# Patient Record
Sex: MALE | Race: WHITE | URBAN - METROPOLITAN AREA
[De-identification: names, ages, dates, MRNs, and addresses within clinical notes are randomized per-mention and may not be internally consistent; named-entity substitution may affect disease eponyms.]

---

## 2017-04-15 ENCOUNTER — HOSPITAL ENCOUNTER (EMERGENCY)
Facility: MEDICAL CENTER | Age: 82
End: 2017-04-15
Attending: EMERGENCY MEDICINE

## 2017-04-15 ENCOUNTER — APPOINTMENT (OUTPATIENT)
Dept: RADIOLOGY | Facility: MEDICAL CENTER | Age: 82
End: 2017-04-15
Attending: EMERGENCY MEDICINE

## 2017-04-15 VITALS
BODY MASS INDEX: 20.46 KG/M2 | TEMPERATURE: 97.3 F | SYSTOLIC BLOOD PRESSURE: 148 MMHG | RESPIRATION RATE: 16 BRPM | HEART RATE: 63 BPM | HEIGHT: 68 IN | DIASTOLIC BLOOD PRESSURE: 70 MMHG | OXYGEN SATURATION: 97 % | WEIGHT: 135 LBS
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
## 2017-04-15 DIAGNOSIS — S01.01XA SCALP LACERATION, INITIAL ENCOUNTER: ICD-10-CM

## 2017-04-15 PROCEDURE — 700111 HCHG RX REV CODE 636 W/ 250 OVERRIDE (IP): Performed by: EMERGENCY MEDICINE

## 2017-04-15 PROCEDURE — 305308 HCHG STAPLER,SKIN,DISP.

## 2017-04-15 PROCEDURE — 70450 CT HEAD/BRAIN W/O DYE: CPT

## 2017-04-15 PROCEDURE — 304999 HCHG REPAIR-SIMPLE/INTERMED LEVEL 1

## 2017-04-15 PROCEDURE — 304561 HCHG STAT O2

## 2017-04-15 PROCEDURE — 700101 HCHG RX REV CODE 250

## 2017-04-15 PROCEDURE — 304562 HCHG STAT O2 MASK/CANNULA

## 2017-04-15 PROCEDURE — 99284 EMERGENCY DEPT VISIT MOD MDM: CPT

## 2017-04-15 PROCEDURE — 90715 TDAP VACCINE 7 YRS/> IM: CPT | Performed by: EMERGENCY MEDICINE

## 2017-04-15 PROCEDURE — 90471 IMMUNIZATION ADMIN: CPT

## 2017-04-15 RX ORDER — LIDOCAINE HYDROCHLORIDE AND EPINEPHRINE 10; 10 MG/ML; UG/ML
INJECTION, SOLUTION INFILTRATION; PERINEURAL
Status: COMPLETED
Start: 2017-04-15 | End: 2017-04-15

## 2017-04-15 RX ORDER — LIDOCAINE HYDROCHLORIDE 10 MG/ML
20 INJECTION, SOLUTION INFILTRATION; PERINEURAL ONCE
Status: DISCONTINUED | OUTPATIENT
Start: 2017-04-15 | End: 2017-04-15

## 2017-04-15 RX ORDER — ASPIRIN 81 MG/1
81 TABLET, CHEWABLE ORAL DAILY
COMMUNITY

## 2017-04-15 RX ORDER — DONEPEZIL HYDROCHLORIDE 5 MG/1
10 TABLET, FILM COATED ORAL NIGHTLY
COMMUNITY

## 2017-04-15 RX ORDER — LIDOCAINE HYDROCHLORIDE AND EPINEPHRINE 10; 10 MG/ML; UG/ML
20 INJECTION, SOLUTION INFILTRATION; PERINEURAL ONCE
Status: DISCONTINUED | OUTPATIENT
Start: 2017-04-15 | End: 2017-04-15

## 2017-04-15 RX ORDER — LISINOPRIL 5 MG/1
5 TABLET ORAL DAILY
COMMUNITY

## 2017-04-15 RX ORDER — SPIRONOLACTONE 25 MG/1
12.5 TABLET ORAL DAILY
COMMUNITY

## 2017-04-15 RX ORDER — LIDOCAINE HYDROCHLORIDE AND EPINEPHRINE 10; 10 MG/ML; UG/ML
20 INJECTION, SOLUTION INFILTRATION; PERINEURAL ONCE
Status: COMPLETED | OUTPATIENT
Start: 2017-04-15 | End: 2017-04-15

## 2017-04-15 RX ORDER — ATORVASTATIN CALCIUM 20 MG/1
40 TABLET, FILM COATED ORAL NIGHTLY
COMMUNITY

## 2017-04-15 RX ORDER — FUROSEMIDE 40 MG/1
40 TABLET ORAL DAILY
COMMUNITY

## 2017-04-15 RX ORDER — CARVEDILOL 3.12 MG/1
3.12 TABLET ORAL 2 TIMES DAILY WITH MEALS
COMMUNITY

## 2017-04-15 RX ADMIN — LIDOCAINE HYDROCHLORIDE,EPINEPHRINE BITARTRATE 20 ML: 10; .01 INJECTION, SOLUTION INFILTRATION; PERINEURAL at 13:15

## 2017-04-15 RX ADMIN — CLOSTRIDIUM TETANI TOXOID ANTIGEN (FORMALDEHYDE INACTIVATED), CORYNEBACTERIUM DIPHTHERIAE TOXOID ANTIGEN (FORMALDEHYDE INACTIVATED), BORDETELLA PERTUSSIS TOXOID ANTIGEN (GLUTARALDEHYDE INACTIVATED), BORDETELLA PERTUSSIS FILAMENTOUS HEMAGGLUTININ ANTIGEN (FORMALDEHYDE INACTIVATED), BORDETELLA PERTUSSIS PERTACTIN ANTIGEN, AND BORDETELLA PERTUSSIS FIMBRIAE 2/3 ANTIGEN 0.5 ML: 5; 2; 2.5; 5; 3; 5 INJECTION, SUSPENSION INTRAMUSCULAR at 13:30

## 2017-04-15 RX ADMIN — LIDOCAINE HYDROCHLORIDE AND EPINEPHRINE 20 ML: 10; 10 INJECTION, SOLUTION INFILTRATION; PERINEURAL at 13:15

## 2017-04-15 ASSESSMENT — PAIN SCALES - GENERAL: PAINLEVEL_OUTOF10: 0

## 2017-04-15 NOTE — ED NOTES
Pt placed for discharge. Pt's son will be returning with clothing and to take him back to group home. PT inappropriate to sit in triage due to hx of dementia-confusion as well as him being a fall risk and not recognizing his limits.

## 2017-04-15 NOTE — DISCHARGE INSTRUCTIONS
Return here or see your doctor in 10 days for staple removal. Return here if other new or worse symptoms

## 2017-04-15 NOTE — ED NOTES
Pt remains cooperative but very repetitive, stating the same things over and over again. Mild confusion. Pt's baseline-dementia.

## 2017-04-15 NOTE — ED NOTES
Pt resting in bed. Transport to CT. PT remains alert, continues to be repetitive. No RT distress observed.

## 2017-04-15 NOTE — ED NOTES
Chief Complaint   Patient presents with   • Fall     from group home, ground level fall, pt does not remember why he fell or if he lost consciousness. Lac to left side of head, oozing, abrasions to left FA as well-wrapped per EMS> Pt has baseline confusion/dementia, pt acting his baseline per group home. Unsure if he's taking blood thinners.

## 2017-04-15 NOTE — ED AVS SNAPSHOT
4/15/2017    Ord CHLOÉ Irwin  [address]  [place] [address]    Dear Ord:    [facility] wants to ensure your discharge home is safe and you or your loved ones have had all of your questions answered regarding your care after you leave the hospital.    Below is a list of resources and contact information should you have any questions regarding your hospital stay, follow-up instructions, or active medical symptoms.    Questions or Concerns Regarding… Contact   Medical Questions Related to Your Discharge  (7 days a week, 8am-5pm) Contact a Nurse Care Coordinator   [phone]   Medical Questions Not Related to Your Discharge  (24 hours a day / 7 days a week)  Contact the Nurse Health Line   [phone]    Medications or Discharge Instructions Refer to your discharge packet   or contact your [facility] Primary Care Provider   [phone]   Follow-up Appointment(s) Schedule your appointment via [employer]   or contact Scheduling [phone]   Billing Review your statement via [employer]  or contact Billing [phone]   Medical Records Review your records via [employer]   or contact Medical Records [phone]     You may receive a telephone call within two days of discharge. This call is to make certain you understand your discharge instructions and have the opportunity to have any questions answered. You can also easily access your medical information, test results and upcoming appointments via the [employer]ight[employer] free online health management tool. You can learn more and sign up at [email or web address]. For assistance setting up your [employer]ight[employer] account, please call [phone].    Once again, we want to ensure your discharge home is safe and that you have a clear understanding of any next steps in your care. If you have any questions or concerns, please do not hesitate to contact us, we are here for you. Thank you for choosing [facility] for your healthcare needs.    Sincerely,    Your [facility] Healthcare Team

## 2017-04-15 NOTE — ED AVS SNAPSHOT
Home Care Instructions                                                                                                                Whit Irwin   MRN: [id number]    Department:  [facility], Emergency Dept   Date of Visit:  4/15/2017            [facility], Emergency Dept    [address]    [place] [address]    Phone:  [phone]      You were seen by     Timothy Newsome M.D.      Your Diagnosis Was     Scalp laceration, initial encounter     S01.01XA       These are the medications you received during your hospitalization from 04/15/2017 1219 to 04/15/2017 1621     Date/Time Order Dose Route Action    04/15/2017 1330 tetanus-dipth-acell pertussis (ADACEL) inj 0.5 mL 0.5 mL Intramuscular Given    04/15/2017 1315 lidocaine-epinephrine 1 %-1:[id number] injection 20 mL 20 mL Injection Given      Medication Information     Review all of your home medications and newly ordered medications with your primary doctor and/or pharmacist as soon as possible. Follow medication instructions as directed by your doctor and/or pharmacist.     Please keep your complete medication list with you and share with your physician. Update the information when medications are discontinued, doses are changed, or new medications (including over-the-counter products) are added; and carry medication information at all times in the event of emergency situations.               Medication List      ASK your doctor about these medications        Instructions    Morning Afternoon Evening Bedtime    aspirin 81 MG Chew chewable tablet   Commonly known as:  ASA        Take 81 mg by mouth every day.   Dose:  81 mg                        atorvastatin 20 MG Tabs   Commonly known as:  LIPITOR        Take 40 mg by mouth every evening.   Dose:  40 mg                        carvedilol 3.125 MG Tabs   Commonly known as:  COREG        Take 3.125 mg by mouth 2 times a day, with meals.   Dose:  3.125 mg                        docusate sodium 50 MG Caps   Commonly known as:  COLACE        Take 50 mg by mouth 2 times a day.   Dose:  50 mg                        donepezil 5 MG Tabs   Commonly known as:  ARICEPT        Take 10 mg by mouth every evening.   Dose:  10 mg                        furosemide 40 MG Tabs   Commonly known as:  LASIX        Take 40 mg by mouth every day.   Dose:  40 mg                        lisinopril 5 MG Tabs   Commonly known as:  PRINIVIL        Take 5 mg by mouth every day.   Dose:  5 mg                        POTASSIUM CHLORIDE ER PO        Take  by mouth.                        spironolactone 25 MG Tabs   Commonly known as:  ALDACTONE        Take 12.5 mg by mouth every day.   Dose:  12.5 mg                        vitamin D 1000 UNIT Tabs   Commonly known as:  cholecalciferol        Take 1,000 Units by mouth every day.   Dose:  1000 Units                                Procedures and tests performed during your visit     CT-HEAD W/O    NURSING COMMUNICATION    WOUND IRRIGATION        Discharge Instructions       Return here or see your doctor in 10 days for staple removal. Return here if other new or worse symptoms          Patient Information     Patient Information    Following emergency treatment: all patient requiring follow-up care must return either to a private physician or a clinic if your condition worsens before you are able to obtain further medical attention, please return to the emergency room.     Billing Information    At [facility], we work to make the billing process streamlined for our patients.  Our Representatives are here to answer any questions you may have regarding your hospital bill.  If you have insurance coverage and have supplied your insurance information to us, we will submit a claim to your insurer on your behalf.  Should you have any questions regarding your bill, we can be reached online or by phone as follows:  Online: You are able pay your bills online or live chat with our  representatives about any billing questions you may have. We are here to help Monday - Friday from 8:00am to 7:30pm and 9:00am - 12:00pm on Saturdays.  Please visit [email or web address]  for more information.   Phone:  [phone] or [phone]    Please note that your emergency physician, surgeon, pathologist, radiologist, anesthesiologist, and other specialists are not employed by [facility] of the [facility] and will therefore bill separately for their services.  Please contact them directly for any questions concerning their bills at the numbers below:     Emergency Physician Services:  [phone]  [facility]:  [phone]  [facility]:  [phone]  [facility]:  [phone]    1. Your final bill may vary from the amount quoted upon discharge if all procedures are not complete at that time, or if your doctor has additional procedures of which we are not aware. You will receive an additional bill if you return to the Emergency Department at [facility] for suture removal regardless of the facility of which the sutures were placed.     2. Please arrange for settlement of this account at the emergency registration.    3. All self-pay accounts are due in full at the time of treatment.  If you are unable to meet this obligation then payment is expected within 4-5 days.     4. If you have had radiology studies (CT, X-ray, Ultrasound, MRI), you have received a preliminary result during your emergency department visit. Please contact the radiology department [phone] to receive a copy of your final result. Please discuss the Final result with your primary physician or with the follow up physician provided.     Crisis Hotline:  [place] Crisis Hotline:  8-510-BDTVOLR or 1-945.256.3447  [place] Crisis Hotline:    1-780.957.6748 or 107-591-2985         ED Discharge Follow Up Questions    1. In order to provide you with very good care, we would  like to follow up with a phone call in the next few days.  May we have your permission to contact you?     YES /  NO    2. What is the best phone number to call you? (       )_____-__________    3. What is the best time to call you?      Morning  /  Afternoon  /  Evening                   Patient Signature:  ____________________________________________________________    Date:  ____________________________________________________________

## 2017-04-15 NOTE — ED NOTES
Pt threw up all over his clothing, large chunks of vomit-unable to clean- placed in personal belonging bag, pt placed in gown. Linens on bed changed.

## 2017-04-15 NOTE — ED AVS SNAPSHOT
TwoTen Access Code: [email or web address]  Expires: 5/15/2017  4:21 PM    Your email address is not on file at [facility].  Email Addresses are required for you to sign up for TwoTen, please contact [phone] to verify your personal information and to provide your email address prior to attempting to register for TwoTen.    Whit Irwin   [address]  [place] [address]    TwoTen  A secure, online tool to manage your health information     [facility]’s TwoTen® is a secure, online tool that connects you to your personalized health information from the privacy of your home -- day or night - making it very easy for you to manage your healthcare. Once the activation process is completed, you can even access your medical information using the TwoTen karthikeyan, which is available for free in the Apple Karthikeyan store or Google Play store.     To learn more about TwoTen, visit [email or web address]    There are two levels of access available (as shown below):   My Chart Features  [facility] Primary Care Doctor [facility]  Specialists [facility]  Urgent  Care Non-[facility] Primary Care Doctor   Email your healthcare team securely and privately 24/7 X X X    Manage appointments: schedule your next appointment; view details of past/upcoming appointments X      Request prescription refills. X      View recent personal medical records, including lab and immunizations X X X X   View health record, including health history, allergies, medications X X X X   Read reports about your outpatient visits, procedures, consult and ER notes X X X X   See your discharge summary, which is a recap of your hospital and/or ER visit that includes your diagnosis, lab results, and care plan X X  X     How to register for Tavernt:  Once your e-mail address has been verified, follow the following steps to sign up for TwoTen.     1. Go to  [email or web address]  2. Click on the Sign Up Now box, which takes you to the New Member Sign Up page. You will need to  provide the following information:  a. Enter your Writer.ly Access Code exactly as it appears at the top of this page. (You will not need to use this code after you’ve completed the sign-up process. If you do not sign up before the expiration date, you must request a new code.)   b. Enter your date of birth.   c. Enter your home email address.   d. Click Submit, and follow the next screen’s instructions.  3. Create a Writer.ly ID. This will be your Writer.ly login ID and cannot be changed, so think of one that is secure and easy to remember.  4. Create a Writer.ly password. You can change your password at any time.  5. Enter your Password Reset Question and Answer. This can be used at a later time if you forget your password.   6. Enter your e-mail address. This allows you to receive e-mail notifications when new information is available in Writer.ly.  7. Click Sign Up. You can now view your health information.    For assistance activating your Writer.ly account, call [phone]

## 2017-04-16 NOTE — ED PROVIDER NOTES
ED Provider Note    CHIEF COMPLAINT  Chief Complaint   Patient presents with   • Fall     from group home, ground level fall, pt does not remember why he fell or if he lost consciousness. Lac to left side of head, oozing, abrasions to left FA as well-wrapped per EMS> Pt has baseline confusion/dementia, pt acting his baseline per group home. Unsure if he's taking blood thinners.       HPI  Whit Irwin is a 89 y.o. male who presents to the emergency department after falling and sustaining a laceration to the head. The patient lives in a group home and suffered a ground-level fall. The patient suffers from significant dementia really cannot describe anything about what happened although he says he feels fine and has no other complaints.    REVIEW OF SYSTEMS the patient denies any pain in the extremities or neck or back or torso.    PAST MEDICAL HISTORY  Past Medical History   Diagnosis Date   • Dementia    • Retention of urine    • Squamous cell carcinoma (CMS-HCC)    • Cholesteatoma    • Hypertrophy (benign) of prostate        FAMILY HISTORY  History reviewed. No pertinent family history.    SOCIAL HISTORY  Social History     Social History   • Marital Status: N/A     Spouse Name: N/A   • Number of Children: N/A   • Years of Education: N/A     Social History Main Topics   • Smoking status: Never Smoker    • Smokeless tobacco: None   • Alcohol Use: No      Comment: former daily drinker. Group home won't allow him to drink   • Drug Use: No   • Sexual Activity: Not Asked     Other Topics Concern   • None     Social History Narrative   • None       SURGICAL HISTORY  History reviewed. No pertinent past surgical history.    CURRENT MEDICATIONS  Home Medications     Reviewed by Damaris Coates R.N. (Registered Nurse) on 04/15/17 at 1235  Med List Status: Not Addressed    Medication Last Dose Status          Patient Wade Taking any Medications                        ALLERGIES  No Known Allergies    PHYSICAL EXAM  VITAL  "SIGNS: /70 mmHg  Pulse 63  Temp(Src) 36.3 °C (97.3 °F)  Resp 16  Ht 1.727 m (5' 8\")  Wt 61.236 kg (135 lb)  BMI 20.53 kg/m2  SpO2 97%   Oxygen saturation is interpreted as adequate  Constitutional: Awake and verbal and very talkative but confused  HENT: There is a 2-1/2 cm laceration to the left side of the scalp just left of the crown which will require closure.  Eyes: Pupils round extraocular motions are present  Neck: C-spine nontender the patient is moving his neck with no apparent inhibition or discomfort  Cardiovascular: Regular rate and rhythm  Lungs: Clear and equal bilaterally with no apparent difficulty breathing  Abdomen/Back: Soft nontender nondistended note peritoneal findings  Skin: Warm and dry  Musculoskeletal: No acute bony deformity  Neurologic: Awake and verbal and moving all extremities    Radiology  CT-HEAD W/O   Final Result      1.  Cerebral atrophy.      2.  White matter lucencies most consistent with small vessel ischemic change versus demyelination or gliosis.      3. Scalp hematoma.      4.  No evidence of acute cerebral infarction, hemorrhage or mass lesion.            PROCEDURES  The scalp was washed and the wound cleansed and using sterile technique closed with 3 scalp staples.    MEDICAL DECISION MAKING and DISPOSITION  The patient generally looks well he suffers from baseline dementia and therefore cannot provide much history but he does not seem distressed or otherwise injured or ill. I think it will be safe for him to return to his group home. The patient is to have staple removal in 10 days and this can be done by his doctor or here in the emergency department. He may be returned here if he otherwise requires emergency medical attention or there are any problems with healing    IMPRESSION  1. 2-1/2 cm scalp laceration stapled in the emergency department  2. Fall      Electronically signed by: Timothy Newsome, 4/16/2017 11:10 AM        "

## 2021-01-13 DIAGNOSIS — Z23 NEED FOR VACCINATION: ICD-10-CM
